# Patient Record
Sex: FEMALE | HISPANIC OR LATINO | URBAN - METROPOLITAN AREA
[De-identification: names, ages, dates, MRNs, and addresses within clinical notes are randomized per-mention and may not be internally consistent; named-entity substitution may affect disease eponyms.]

---

## 2023-01-30 ENCOUNTER — OFFICE VISIT (OUTPATIENT)
Dept: URBAN - METROPOLITAN AREA CLINIC 27 | Facility: CLINIC | Age: 38
End: 2023-01-30
Payer: COMMERCIAL

## 2023-01-30 ENCOUNTER — WEB ENCOUNTER (OUTPATIENT)
Dept: URBAN - METROPOLITAN AREA CLINIC 27 | Facility: CLINIC | Age: 38
End: 2023-01-30

## 2023-01-30 ENCOUNTER — LAB OUTSIDE AN ENCOUNTER (OUTPATIENT)
Dept: URBAN - METROPOLITAN AREA CLINIC 27 | Facility: CLINIC | Age: 38
End: 2023-01-30

## 2023-01-30 ENCOUNTER — TELEPHONE ENCOUNTER (OUTPATIENT)
Dept: URBAN - METROPOLITAN AREA CLINIC 27 | Facility: CLINIC | Age: 38
End: 2023-01-30

## 2023-01-30 VITALS
SYSTOLIC BLOOD PRESSURE: 89 MMHG | BODY MASS INDEX: 22.23 KG/M2 | DIASTOLIC BLOOD PRESSURE: 67 MMHG | HEIGHT: 65 IN | WEIGHT: 133.4 LBS | HEART RATE: 72 BPM

## 2023-01-30 DIAGNOSIS — K90.0 CELIAC DISEASE: ICD-10-CM

## 2023-01-30 DIAGNOSIS — R19.5 LOOSE STOOLS: ICD-10-CM

## 2023-01-30 DIAGNOSIS — R10.84 GENERALIZED ABDOMINAL PAIN: ICD-10-CM

## 2023-01-30 DIAGNOSIS — E73.8 OTHER LACTOSE INTOLERANCE: ICD-10-CM

## 2023-01-30 DIAGNOSIS — E03.9 HYPOTHYROIDISM: ICD-10-CM

## 2023-01-30 DIAGNOSIS — Z80.0 FAMILY HISTORY OF COLON CANCER: ICD-10-CM

## 2023-01-30 DIAGNOSIS — R14.0 BLOATING: ICD-10-CM

## 2023-01-30 DIAGNOSIS — F41.9 ANXIETY: ICD-10-CM

## 2023-01-30 DIAGNOSIS — K64.9: ICD-10-CM

## 2023-01-30 PROBLEM — 396331005 CELIAC DISEASE: Status: ACTIVE | Noted: 2023-01-30

## 2023-01-30 PROBLEM — 48694002 ANXIETY: Status: ACTIVE | Noted: 2023-01-30

## 2023-01-30 PROBLEM — 782415009 INTOLERANCE TO LACTOSE (FINDING): Status: ACTIVE | Noted: 2023-01-30

## 2023-01-30 PROBLEM — 40930008 HYPOTHYROIDISM: Status: ACTIVE | Noted: 2023-01-30

## 2023-01-30 PROCEDURE — 99204 OFFICE O/P NEW MOD 45 MIN: CPT | Performed by: INTERNAL MEDICINE

## 2023-01-30 RX ORDER — HYOSCYAMINE SULFATE 0.12 MG/1
1 OR 2 TABLETS AS NEEDED FOR ABDOMINAL PAIN TABLET ORAL EVERY 6 HOURS
Qty: 90 | Refills: 2 | OUTPATIENT
Start: 2023-01-30 | End: 2023-04-30

## 2023-01-30 NOTE — HPI-TODAY'S VISIT:
Patient here self-referred for evaluation of diarrhea/loose stools that have been present since the age of 17.  She typically has anywhere from 10-35 stools per day.  They are occasionally nocturnal.  They are sometimes voluminous; this occurs more frequently in the morning.  She denies any food sensitivities aside from gluten; she was apparently diagnosed with celiac disease per serology at age 23.  She has been gluten-free since then.  She thinks that she may also be mildly lactose intolerant, especially to milk.  Imodium and Pepto are not particularly effective.  She has never been on a prescription antidiarrheal Rx.  She has intermittent bloating and generalized abdominal pain which are often worse when the diarrhea is more bothersome, and often improve with a bowel movement.  The abdominal pain is not nocturnal.  She does take a probiotic, though not every day.  She does not use a fiber supplement.  She does not use any anti-gas medication aside from Gas-X.  She has not lost any weight.  She has occasional "hemorrhoid problems" for which topical therapy is helpful.  She has a history of alcohol use but has not had any alcohol since October.  She does have a history of anxiety and she notes that her GI symptoms are definitely worse when the anxiety is more bothersome.  She last underwent colonoscopy in 2015, at which time "inflammation" was found.  She had an upper endoscopy at age 25; results unknown.  Her father had rectal cancer at age 65.  She has not had any recent labs.  She thinks that she had stool studies (?negative) checked in the past.

## 2023-02-24 ENCOUNTER — ERX REFILL RESPONSE (OUTPATIENT)
Dept: URBAN - METROPOLITAN AREA CLINIC 27 | Facility: CLINIC | Age: 38
End: 2023-02-24

## 2023-02-24 RX ORDER — HYOSCYAMINE SULFATE 0.12 MG/1
TAKE 1 OR 2 TABLETS BY MOUTH EVERY 6 HOURS AS NEEDED FOR ABDOMINAL PAIN FOR 30 DAYS TABLET ORAL
Qty: 90 TABLET | Refills: 2 | OUTPATIENT

## 2023-02-24 RX ORDER — HYOSCYAMINE SULFATE 0.12 MG/1
TAKE 1 OR 2 TABLETS BY MOUTH EVERY 6 HOURS AS NEEDED FOR ABDOMINAL PAIN FOR 30 DAYS TABLET ORAL EVERY 6 HOURS
Qty: 90 TABLET | Refills: 3 | OUTPATIENT

## 2023-04-21 ENCOUNTER — TELEPHONE ENCOUNTER (OUTPATIENT)
Dept: URBAN - METROPOLITAN AREA CLINIC 92 | Facility: CLINIC | Age: 38
End: 2023-04-21

## 2023-04-26 ENCOUNTER — TELEPHONE ENCOUNTER (OUTPATIENT)
Dept: URBAN - METROPOLITAN AREA CLINIC 27 | Facility: CLINIC | Age: 38
End: 2023-04-26

## 2023-04-26 RX ORDER — POLYETHYLENE GLYCOL-3350 AND ELECTROLYTES 236; 6.74; 5.86; 2.97; 22.74 G/274.31G; G/274.31G; G/274.31G; G/274.31G; G/274.31G
TAKE 4,000 ML PREP AS DIRECTED FOR COLONOSCOPY POWDER, FOR SOLUTION ORAL ONCE A DAY
Qty: 4000 MILLILITER | Refills: 0 | OUTPATIENT
Start: 2023-04-27 | End: 2023-04-28

## 2023-05-19 ENCOUNTER — OFFICE VISIT (OUTPATIENT)
Dept: URBAN - METROPOLITAN AREA SURGERY CENTER 7 | Facility: SURGERY CENTER | Age: 38
End: 2023-05-19

## 2023-05-19 ENCOUNTER — CLAIMS CREATED FROM THE CLAIM WINDOW (OUTPATIENT)
Dept: URBAN - METROPOLITAN AREA SURGERY CENTER 7 | Facility: SURGERY CENTER | Age: 38
End: 2023-05-19
Payer: COMMERCIAL

## 2023-05-19 ENCOUNTER — LAB OUTSIDE AN ENCOUNTER (OUTPATIENT)
Dept: URBAN - METROPOLITAN AREA CLINIC 27 | Facility: CLINIC | Age: 38
End: 2023-05-19

## 2023-05-19 DIAGNOSIS — Z80.0 BROTHER AT YOUNG AGE FAMILY HISTORY OF COLON CANCER: ICD-10-CM

## 2023-05-19 DIAGNOSIS — R19.7 ACUTE DIARRHEA: ICD-10-CM

## 2023-05-19 DIAGNOSIS — K52.89 (LYMPHOCYTIC) MICROSCOPIC COLITIS: ICD-10-CM

## 2023-05-19 PROCEDURE — G8907 PT DOC NO EVENTS ON DISCHARG: HCPCS | Performed by: INTERNAL MEDICINE

## 2023-05-19 PROCEDURE — 45380 COLONOSCOPY AND BIOPSY: CPT | Performed by: INTERNAL MEDICINE

## 2023-05-19 RX ORDER — HYOSCYAMINE SULFATE 0.12 MG/1
TAKE 1 OR 2 TABLETS BY MOUTH EVERY 6 HOURS AS NEEDED FOR ABDOMINAL PAIN FOR 30 DAYS TABLET ORAL
Qty: 90 TABLET | Refills: 2 | Status: ACTIVE | COMMUNITY

## 2023-07-06 LAB
CLIENT CONTACT:: (no result)
COMMENT: (no result)
HELICOBACTER PYLORI AG, EIA, STOOL: (no result)
REPORT ALWAYS MESSAGE SIGNATURE: (no result)
TEST CODE:: (no result)
TEST NAME:: (no result)

## 2023-08-23 ENCOUNTER — TELEPHONE ENCOUNTER (OUTPATIENT)
Dept: URBAN - METROPOLITAN AREA CLINIC 27 | Facility: CLINIC | Age: 38
End: 2023-08-23

## 2023-08-24 ENCOUNTER — TELEPHONE ENCOUNTER (OUTPATIENT)
Dept: URBAN - METROPOLITAN AREA CLINIC 27 | Facility: CLINIC | Age: 38
End: 2023-08-24

## 2023-09-05 ENCOUNTER — TELEPHONE ENCOUNTER (OUTPATIENT)
Dept: URBAN - METROPOLITAN AREA CLINIC 27 | Facility: CLINIC | Age: 38
End: 2023-09-05

## 2023-09-05 ENCOUNTER — OFFICE VISIT (OUTPATIENT)
Dept: URBAN - METROPOLITAN AREA CLINIC 27 | Facility: CLINIC | Age: 38
End: 2023-09-05
Payer: COMMERCIAL

## 2023-09-05 VITALS
BODY MASS INDEX: 22.16 KG/M2 | WEIGHT: 133 LBS | SYSTOLIC BLOOD PRESSURE: 100 MMHG | DIASTOLIC BLOOD PRESSURE: 67 MMHG | HEART RATE: 72 BPM | HEIGHT: 65 IN

## 2023-09-05 DIAGNOSIS — R19.5 LOOSE STOOLS: ICD-10-CM

## 2023-09-05 DIAGNOSIS — R10.84 GENERALIZED ABDOMINAL PAIN: ICD-10-CM

## 2023-09-05 DIAGNOSIS — F41.9 ANXIETY: ICD-10-CM

## 2023-09-05 DIAGNOSIS — K90.0 CELIAC DISEASE: ICD-10-CM

## 2023-09-05 DIAGNOSIS — R11.0 NAUSEA: ICD-10-CM

## 2023-09-05 DIAGNOSIS — K62.5 RECTAL BLEEDING: ICD-10-CM

## 2023-09-05 PROCEDURE — 99214 OFFICE O/P EST MOD 30 MIN: CPT | Performed by: INTERNAL MEDICINE

## 2023-09-05 RX ORDER — HYOSCYAMINE SULFATE 0.12 MG/1
TAKE 1 OR 2 TABLETS BY MOUTH EVERY 6 HOURS AS NEEDED FOR ABDOMINAL PAIN FOR 30 DAYS TABLET ORAL
Qty: 90 TABLET | Refills: 2 | Status: ACTIVE | COMMUNITY

## 2023-09-05 RX ORDER — ONDANSETRON HYDROCHLORIDE 8 MG/1
TAKE 1/2 OR 1 TABLET TABLET, FILM COATED ORAL
Qty: 60 | Refills: 2 | OUTPATIENT
Start: 2023-09-05

## 2023-09-05 NOTE — HPI-TODAY'S VISIT:
Patient here for followup of diarrhea/loose stools that have been present since the age of 17.  She underwent colonoscopy in May.  The colon and terminal ileum appeared grossly normal.  Biopsies from the terminal ileum were normal.  Random biopsies from the colon showed "chronic active colitis of unclear etiology".  Stool studies in January were negative/normal aside from a mildly low fecal pancreatic elastase level and an increased amount of fecal leukocytes.  She was given samples of Viberzi following the colonoscopy, but it does not appear that she took this.  She was asked to begin a fiber supplement, a probiotic and Imodium.  She is here in follow-up.  Her diarrhea has definitely improved.  She currently has 2 stools per day; they are formed.  She does take a daily probiotic, but is not currently taking a fiber supplement as apparently this caused crampy abdominal pain.  She takes 1 Imodium nearly every day; this is very helpful.  Daily use causes mild constipation.  She has infrequent minor hematochezia in the toilet water approximately once a month.  She has also had "anal itching" and is using some type of topical therapy 3 times per week which is somewhat helpful.  Using a bidet has also been helpful.  She has intermittent nausea from "motion sickness" and an upset stomach.  PRN Zofran approximately once a week is helpful.  Her weight is stable.  She denies significant anxiety at present.  Celiac serologies in January were negative.  Her hemoglobin was normal in January, as were her liver enzymes and CRP and ESR.    She denies any food sensitivities aside from gluten; she was apparently diagnosed with celiac disease per serology at age 23.  She has been gluten-free since then.  She thinks that she may also be mildly lactose intolerant, especially to milk.  She has never been on a prescription antidiarrheal Rx.  She has a history of alcohol use but has not had any alcohol since last October.  She does have a history of anxiety and she notes that her GI symptoms are definitely worse when the anxiety is more bothersome.  She had an upper endoscopy at age 25; results unknown.  Her father had rectal cancer at age 65.

## 2023-09-26 ENCOUNTER — OFFICE VISIT (OUTPATIENT)
Dept: URBAN - METROPOLITAN AREA CLINIC 27 | Facility: CLINIC | Age: 38
End: 2023-09-26

## 2023-12-28 ENCOUNTER — TELEPHONE ENCOUNTER (OUTPATIENT)
Dept: URBAN - METROPOLITAN AREA CLINIC 27 | Facility: CLINIC | Age: 38
End: 2023-12-28

## 2024-01-31 ENCOUNTER — OFFICE VISIT (OUTPATIENT)
Dept: URBAN - METROPOLITAN AREA SURGERY CENTER 7 | Facility: SURGERY CENTER | Age: 39
End: 2024-01-31

## 2024-02-27 ENCOUNTER — OV EP (OUTPATIENT)
Dept: URBAN - METROPOLITAN AREA CLINIC 27 | Facility: CLINIC | Age: 39
End: 2024-02-27
Payer: COMMERCIAL

## 2024-02-27 VITALS
HEIGHT: 65 IN | DIASTOLIC BLOOD PRESSURE: 68 MMHG | WEIGHT: 140 LBS | BODY MASS INDEX: 23.32 KG/M2 | SYSTOLIC BLOOD PRESSURE: 96 MMHG | HEART RATE: 80 BPM

## 2024-02-27 DIAGNOSIS — R11.0 NAUSEA: ICD-10-CM

## 2024-02-27 DIAGNOSIS — R10.84 GENERALIZED ABDOMINAL PAIN: ICD-10-CM

## 2024-02-27 DIAGNOSIS — F41.9 ANXIETY: ICD-10-CM

## 2024-02-27 DIAGNOSIS — K90.0 CELIAC DISEASE: ICD-10-CM

## 2024-02-27 DIAGNOSIS — R19.5 LOOSE STOOLS: ICD-10-CM

## 2024-02-27 DIAGNOSIS — K62.5 RECTAL BLEEDING: ICD-10-CM

## 2024-02-27 DIAGNOSIS — R14.0 BLOATING: ICD-10-CM

## 2024-02-27 DIAGNOSIS — E03.9 HYPOTHYROIDISM: ICD-10-CM

## 2024-02-27 PROCEDURE — 99214 OFFICE O/P EST MOD 30 MIN: CPT | Performed by: INTERNAL MEDICINE

## 2024-02-27 RX ORDER — HYOSCYAMINE SULFATE 0.12 MG/1
TAKE 1 OR 2 TABLETS BY MOUTH EVERY 6 HOURS AS NEEDED FOR ABDOMINAL PAIN FOR 30 DAYS TABLET ORAL
Qty: 90 TABLET | Refills: 2 | Status: ACTIVE | COMMUNITY

## 2024-02-27 RX ORDER — ONDANSETRON HYDROCHLORIDE 8 MG/1
TAKE 1/2 OR 1 TABLET TABLET, FILM COATED ORAL
Qty: 60 | Refills: 2 | Status: ACTIVE | COMMUNITY
Start: 2023-09-05

## 2024-02-27 NOTE — HPI-TODAY'S VISIT:
Patient here for GI follow-up. She continues to have intermittent symptomatology.  She has infrequent nausea approximately 3 times a month for which PRN Zofran is helpful. Her symptoms seem to be triggered by driving or "motion sickness".  She denies significant abdominal pain at present, and a recent trial of hyoscyamine was unhelpful. She does take 2 IBGard daily which seems to help "settle my stomach".  She continues to have persistent bloating for which Gas-X once or twice a day is somewhat helpful. She does take a probiotic. She has not tried FDGard or Charcocaps. She continues to have intermittent diarrhea and currently takes Imodium once or twice a day. Higher doses apparently cause constipation.  She had an extended "flare" of her diarrhea late last year/earlier this year which was triggered by apparently eating a salad.  She notes that she is gluten sensitive. Fiber supplementation causes gas. She denies significant anxiety at present.  She did not submit a stool specimen last visit for pancreatic fecal elastase.  She was given samples of Viberzi at last visit but has not yet tried these.  She has rare rectal bleeding for which topical therapy is helpful.   Her diarrhea/loose stools have been present since the age of 17.  She underwent colonoscopy last May.  The colon and terminal ileum appeared grossly normal.  Biopsies from the terminal ileum were normal.  Random biopsies from the colon showed "chronic active colitis of unclear etiology".  Stool studies early last year were negative/normal aside from a mildly low fecal pancreatic elastase level and an increased amount of fecal leukocytes. Celiac serologies last year were negative.  Her hemoglobin was normal at last check, as were her liver enzymes and CRP and ESR.    She denies any food sensitivities aside from gluten; she was apparently diagnosed with celiac disease per serology at age 23.  She has been gluten-free since then.  She thinks that she may also be mildly lactose intolerant, especially to milk.  She has never been on a prescription antidiarrheal Rx.  She has a history of alcohol use but has not had any alcohol since October 2022.  She does have a history of anxiety and she notes that her GI symptoms are definitely worse when the anxiety is more bothersome.  She had an upper endoscopy at age 25; results unknown.  Her father had rectal cancer at age 65.

## 2024-02-28 ENCOUNTER — LAB (OUTPATIENT)
Dept: URBAN - METROPOLITAN AREA CLINIC 27 | Facility: CLINIC | Age: 39
End: 2024-02-28

## 2024-06-04 ENCOUNTER — WEB ENCOUNTER (OUTPATIENT)
Dept: URBAN - METROPOLITAN AREA CLINIC 27 | Facility: CLINIC | Age: 39
End: 2024-06-04

## 2024-06-05 ENCOUNTER — WEB ENCOUNTER (OUTPATIENT)
Dept: URBAN - METROPOLITAN AREA CLINIC 27 | Facility: CLINIC | Age: 39
End: 2024-06-05

## 2024-06-05 ENCOUNTER — TELEPHONE ENCOUNTER (OUTPATIENT)
Dept: URBAN - METROPOLITAN AREA CLINIC 27 | Facility: CLINIC | Age: 39
End: 2024-06-05

## 2024-07-18 ENCOUNTER — WEB ENCOUNTER (OUTPATIENT)
Dept: URBAN - METROPOLITAN AREA SURGERY CENTER 7 | Facility: SURGERY CENTER | Age: 39
End: 2024-07-18

## 2024-07-26 ENCOUNTER — WEB ENCOUNTER (OUTPATIENT)
Dept: URBAN - METROPOLITAN AREA CLINIC 27 | Facility: CLINIC | Age: 39
End: 2024-07-26